# Patient Record
Sex: MALE | Race: OTHER | NOT HISPANIC OR LATINO
[De-identification: names, ages, dates, MRNs, and addresses within clinical notes are randomized per-mention and may not be internally consistent; named-entity substitution may affect disease eponyms.]

---

## 2018-05-09 PROBLEM — Z00.00 ENCOUNTER FOR PREVENTIVE HEALTH EXAMINATION: Status: ACTIVE | Noted: 2018-05-09

## 2018-05-10 ENCOUNTER — APPOINTMENT (OUTPATIENT)
Dept: OTOLARYNGOLOGY | Facility: CLINIC | Age: 28
End: 2018-05-10
Payer: COMMERCIAL

## 2018-05-10 VITALS — WEIGHT: 180 LBS | HEIGHT: 69 IN | BODY MASS INDEX: 26.66 KG/M2

## 2018-05-10 DIAGNOSIS — J34.2 DEVIATED NASAL SEPTUM: ICD-10-CM

## 2018-05-10 DIAGNOSIS — Z78.9 OTHER SPECIFIED HEALTH STATUS: ICD-10-CM

## 2018-05-10 DIAGNOSIS — J34.3 HYPERTROPHY OF NASAL TURBINATES: ICD-10-CM

## 2018-05-10 DIAGNOSIS — J30.9 ALLERGIC RHINITIS, UNSPECIFIED: ICD-10-CM

## 2018-05-10 PROCEDURE — 99204 OFFICE O/P NEW MOD 45 MIN: CPT | Mod: 25

## 2018-05-10 PROCEDURE — 31231 NASAL ENDOSCOPY DX: CPT

## 2018-07-09 ENCOUNTER — APPOINTMENT (OUTPATIENT)
Dept: OTOLARYNGOLOGY | Facility: CLINIC | Age: 28
End: 2018-07-09

## 2022-05-05 ENCOUNTER — APPOINTMENT (OUTPATIENT)
Dept: UROLOGY | Facility: CLINIC | Age: 32
End: 2022-05-05

## 2023-09-04 ENCOUNTER — EMERGENCY (EMERGENCY)
Facility: HOSPITAL | Age: 33
LOS: 0 days | Discharge: HOME | End: 2023-09-04
Attending: STUDENT IN AN ORGANIZED HEALTH CARE EDUCATION/TRAINING PROGRAM
Payer: COMMERCIAL

## 2023-09-04 VITALS
TEMPERATURE: 99 F | RESPIRATION RATE: 18 BRPM | DIASTOLIC BLOOD PRESSURE: 74 MMHG | OXYGEN SATURATION: 94 % | SYSTOLIC BLOOD PRESSURE: 120 MMHG | HEART RATE: 80 BPM

## 2023-09-04 DIAGNOSIS — R51.9 HEADACHE, UNSPECIFIED: ICD-10-CM

## 2023-09-04 DIAGNOSIS — H53.9 UNSPECIFIED VISUAL DISTURBANCE: ICD-10-CM

## 2023-09-04 LAB
ALBUMIN SERPL ELPH-MCNC: 4.6 G/DL — SIGNIFICANT CHANGE UP (ref 3.5–5.2)
ALP SERPL-CCNC: 60 U/L — SIGNIFICANT CHANGE UP (ref 30–115)
ALT FLD-CCNC: 25 U/L — SIGNIFICANT CHANGE UP (ref 0–41)
ANION GAP SERPL CALC-SCNC: 8 MMOL/L — SIGNIFICANT CHANGE UP (ref 7–14)
AST SERPL-CCNC: 25 U/L — SIGNIFICANT CHANGE UP (ref 0–41)
BASOPHILS # BLD AUTO: 0.03 K/UL — SIGNIFICANT CHANGE UP (ref 0–0.2)
BASOPHILS NFR BLD AUTO: 0.5 % — SIGNIFICANT CHANGE UP (ref 0–1)
BILIRUB SERPL-MCNC: 0.2 MG/DL — SIGNIFICANT CHANGE UP (ref 0.2–1.2)
BUN SERPL-MCNC: 25 MG/DL — HIGH (ref 10–20)
CALCIUM SERPL-MCNC: 9.4 MG/DL — SIGNIFICANT CHANGE UP (ref 8.4–10.5)
CHLORIDE SERPL-SCNC: 104 MMOL/L — SIGNIFICANT CHANGE UP (ref 98–110)
CO2 SERPL-SCNC: 27 MMOL/L — SIGNIFICANT CHANGE UP (ref 17–32)
CREAT SERPL-MCNC: 1.3 MG/DL — SIGNIFICANT CHANGE UP (ref 0.7–1.5)
EGFR: 75 ML/MIN/1.73M2 — SIGNIFICANT CHANGE UP
EOSINOPHIL # BLD AUTO: 0.28 K/UL — SIGNIFICANT CHANGE UP (ref 0–0.7)
EOSINOPHIL NFR BLD AUTO: 4.3 % — SIGNIFICANT CHANGE UP (ref 0–8)
GLUCOSE SERPL-MCNC: 91 MG/DL — SIGNIFICANT CHANGE UP (ref 70–99)
HCT VFR BLD CALC: 44.8 % — SIGNIFICANT CHANGE UP (ref 42–52)
HGB BLD-MCNC: 15.3 G/DL — SIGNIFICANT CHANGE UP (ref 14–18)
IMM GRANULOCYTES NFR BLD AUTO: 0.3 % — SIGNIFICANT CHANGE UP (ref 0.1–0.3)
LYMPHOCYTES # BLD AUTO: 1.64 K/UL — SIGNIFICANT CHANGE UP (ref 1.2–3.4)
LYMPHOCYTES # BLD AUTO: 25.3 % — SIGNIFICANT CHANGE UP (ref 20.5–51.1)
MCHC RBC-ENTMCNC: 30.6 PG — SIGNIFICANT CHANGE UP (ref 27–31)
MCHC RBC-ENTMCNC: 34.2 G/DL — SIGNIFICANT CHANGE UP (ref 32–37)
MCV RBC AUTO: 89.6 FL — SIGNIFICANT CHANGE UP (ref 80–94)
MONOCYTES # BLD AUTO: 0.44 K/UL — SIGNIFICANT CHANGE UP (ref 0.1–0.6)
MONOCYTES NFR BLD AUTO: 6.8 % — SIGNIFICANT CHANGE UP (ref 1.7–9.3)
NEUTROPHILS # BLD AUTO: 4.06 K/UL — SIGNIFICANT CHANGE UP (ref 1.4–6.5)
NEUTROPHILS NFR BLD AUTO: 62.8 % — SIGNIFICANT CHANGE UP (ref 42.2–75.2)
NRBC # BLD: 0 /100 WBCS — SIGNIFICANT CHANGE UP (ref 0–0)
PLATELET # BLD AUTO: 230 K/UL — SIGNIFICANT CHANGE UP (ref 130–400)
PMV BLD: 8.9 FL — SIGNIFICANT CHANGE UP (ref 7.4–10.4)
POTASSIUM SERPL-MCNC: 4.3 MMOL/L — SIGNIFICANT CHANGE UP (ref 3.5–5)
POTASSIUM SERPL-SCNC: 4.3 MMOL/L — SIGNIFICANT CHANGE UP (ref 3.5–5)
PROT SERPL-MCNC: 6.5 G/DL — SIGNIFICANT CHANGE UP (ref 6–8)
RBC # BLD: 5 M/UL — SIGNIFICANT CHANGE UP (ref 4.7–6.1)
RBC # FLD: 12 % — SIGNIFICANT CHANGE UP (ref 11.5–14.5)
SODIUM SERPL-SCNC: 139 MMOL/L — SIGNIFICANT CHANGE UP (ref 135–146)
TROPONIN T SERPL-MCNC: <0.01 NG/ML — SIGNIFICANT CHANGE UP
WBC # BLD: 6.47 K/UL — SIGNIFICANT CHANGE UP (ref 4.8–10.8)
WBC # FLD AUTO: 6.47 K/UL — SIGNIFICANT CHANGE UP (ref 4.8–10.8)

## 2023-09-04 PROCEDURE — 93005 ELECTROCARDIOGRAM TRACING: CPT

## 2023-09-04 PROCEDURE — 80053 COMPREHEN METABOLIC PANEL: CPT

## 2023-09-04 PROCEDURE — 70498 CT ANGIOGRAPHY NECK: CPT | Mod: 26,MA

## 2023-09-04 PROCEDURE — 99285 EMERGENCY DEPT VISIT HI MDM: CPT

## 2023-09-04 PROCEDURE — 99285 EMERGENCY DEPT VISIT HI MDM: CPT | Mod: 25

## 2023-09-04 PROCEDURE — 36415 COLL VENOUS BLD VENIPUNCTURE: CPT

## 2023-09-04 PROCEDURE — 70496 CT ANGIOGRAPHY HEAD: CPT | Mod: 26,MA

## 2023-09-04 PROCEDURE — 84484 ASSAY OF TROPONIN QUANT: CPT

## 2023-09-04 PROCEDURE — 85025 COMPLETE CBC W/AUTO DIFF WBC: CPT

## 2023-09-04 PROCEDURE — 70450 CT HEAD/BRAIN W/O DYE: CPT | Mod: MA

## 2023-09-04 PROCEDURE — 70496 CT ANGIOGRAPHY HEAD: CPT | Mod: MA

## 2023-09-04 PROCEDURE — 70498 CT ANGIOGRAPHY NECK: CPT | Mod: MA

## 2023-09-04 PROCEDURE — 70450 CT HEAD/BRAIN W/O DYE: CPT | Mod: 26,MA,59

## 2023-09-04 PROCEDURE — 93010 ELECTROCARDIOGRAM REPORT: CPT

## 2023-09-04 RX ORDER — SODIUM CHLORIDE 9 MG/ML
1000 INJECTION, SOLUTION INTRAVENOUS ONCE
Refills: 0 | Status: COMPLETED | OUTPATIENT
Start: 2023-09-04 | End: 2023-09-04

## 2023-09-04 RX ORDER — METOCLOPRAMIDE HCL 10 MG
10 TABLET ORAL ONCE
Refills: 0 | Status: COMPLETED | OUTPATIENT
Start: 2023-09-04 | End: 2023-09-04

## 2023-09-04 RX ADMIN — SODIUM CHLORIDE 1000 MILLILITER(S): 9 INJECTION, SOLUTION INTRAVENOUS at 19:36

## 2023-09-04 NOTE — ED PROVIDER NOTE - NSFOLLOWUPINSTRUCTIONS_ED_ALL_ED_FT
Visual Disturbances    You have had a disturbance in your vision. This may be caused by various conditions, such as:    Migraines. Migraine headaches are often preceded by a disturbance in vision. Blind spots or light flashes are followed by a headache. This type of visual disturbance is temporary. It does not damage the eye.  Glaucoma. This is caused by increased pressure in the eye. Symptoms include haziness, blurred vision, or seeing rainbow colored circles when looking at bright lights. Partial or complete visual loss can occur. You may or may not experience eye pain. Visual loss may be gradual or sudden and is irreversible. Glaucoma is the leading cause of blindness.  Retina problems. Vision will be reduced if the retina becomes detached or if there is a circulation problem as with diabetes, high blood pressure, or a mini-stroke. Symptoms include seeing "floaters," flashes of light, or shadows, as if a curtain has fallen over your eye.  Optic nerve problems. The main nerve in your eye can be damaged by redness, soreness, and swelling (inflammation), poor circulation, drugs, and toxins.    It is very important to have a complete exam done by a specialist to determine the exact cause of your eye problem. The specialist may recommend medicines or surgery, depending on the cause of the problem. This can help prevent further loss of vision or reduce the risk of having a stroke. Contact the caregiver to whom you have been referred and arrange for follow-up care right away.    SEEK IMMEDIATE MEDICAL CARE IF:  Your vision gets worse.  You develop severe headaches.  You have any weakness or numbness in the face, arms, or legs.  You have any trouble speaking or walking.    ADDITIONAL NOTES AND INSTRUCTIONS    Please follow up with your Primary MD in 24-48 hr.  Seek immediate medical care for any new/worsening signs or symptoms.    Headache    A headache is pain or discomfort felt around the head or neck area. The specific cause of a headache may not be found as there are many types including tension headaches, migraine headaches, and cluster headaches. Watch your condition for any changes. Things you can do to manage your pain include taking over the counter and prescription medications as instructed by your health care provider, lying down in a dark quiet room, limiting stress, getting regular sleep, and refraining from alcohol and tobacco products.    SEEK IMMEDIATE MEDICAL CARE IF YOU HAVE ANY OF THE FOLLOWING SYMPTOMS: fever, vomiting, stiff neck, loss of vision, problems with speech, muscle weakness, loss of balance, trouble walking, passing out, or confusion.

## 2023-09-04 NOTE — CONSULT NOTE ADULT - ASSESSMENT
32 yo M,  w/o significant PMH, p/w episode of distorted vison of faces and objects, "tripled vision" more on the left upper field in both eyes lasted about 45 minuts followed by L-sided HA suggestive of new-onset of ocular migraine. Possible other causes: focal epilepsy in occipital area w/o N/V is possible but followed w HA and only one episode - less likely. Absence of cardiovascular risk factors - less likely TIA as well. On today's exam he is asymptomatic, neuroexam is non-focal and admits having sleep deprivation and working hard w possible dehydration that may triggered this event. Admits having HA not often but never with visual changes before. Needs f/u neurology for MR brain w/o contrast, rEEG in OP settings.     Recommendations:     - f/u neurology in OP settings, w MRI brain w/o, rEEG  - avoid sleep deprivation, dehydration, c/w healthy life-style.   - Magnesium 400 mg daily  - Vit B- complex supplementations  - HA dairy w checking BP at the time of HA and f/u w neurology       The case was discussed w attending Dr. Greene  32 yo M,  w/o significant PMH, p/w episode of distorted vison of faces and objects, "tripled vision" more on the left upper field in both eyes lasted about 45 minuts followed by L-sided HA suggestive of new-onset of ocular migraine. Possible other causes: focal epilepsy in occipital area w/o N/V is possible but followed w HA and only one episode - less likely. Absence of cardiovascular risk factors - less likely TIA as well. On today's exam he is asymptomatic, neuroexam is non-focal and admits having sleep deprivation and working hard w possible dehydration that may triggered this event. Admits having HA not often but never with visual changes before. Needs f/u neurology for MR brain w/o contrast, rEEG in OP settings.     Recommendations:     - f/u CTH and CTA official report  - f/u neurology in OP settings, w MRI brain w/o, rEEG  - avoid sleep deprivation, dehydration, c/w healthy life-style.   - Magnesium 400 mg daily  - Vit B- complex supplementations  - HA dairy w checking BP at the time of HA and f/u w neurology       The case was discussed w attending Dr. Greene  34 yo M,  w/o significant PMH, p/w episode of distorted vison of faces and objects, "tripled vision" more on the left upper field in both eyes lasted about 45 minuts followed by L-sided HA suggestive of new-onset of ocular migraine. Possible other causes: focal epilepsy in occipital area w/o N/V is possible but followed w HA and only one episode - less likely. Absence of cardiovascular risk factors - less likely TIA as well. On today's exam he is asymptomatic, neuroexam is non-focal and admits having sleep deprivation and working hard w possible dehydration that may triggered this event. Admits having HA not often but never with visual changes before. Needs f/u neurology for MR brain w/o contrast, rEEG in OP settings.     Recommendations:     - CTH and CTA official report: negative for acute pathology, no contraindications for d/c home from neurology standpoint  - f/u neurology in OP settings, w  possible MRI brain w/o contrast, rEEG per neurologist discretion  - avoid sleep deprivation, dehydration, c/w healthy life-style.   - Magnesium 400 mg daily  - Vit B- complex supplementations  - HA dairy w checking BP at the time of HA and f/u w neurology       The case was discussed w attending Dr. Greene

## 2023-09-04 NOTE — ED PROVIDER NOTE - CLINICAL SUMMARY MEDICAL DECISION MAKING FREE TEXT BOX
Patient presented for episode of vision change followed by right sided headache. Labs and EKG were ordered and reviewed.  Imaging was ordered and reviewed by me.  Appropriate medications for patient's presenting complaints were ordered and effects were reassessed. Neurology consulted and recommending outpatient follow up. Patient feels much better and is comfortable with discharge.  Appropriate follow-up was arranged.

## 2023-09-04 NOTE — ED ADULT NURSE NOTE - NSFALLUNIVINTERV_ED_ALL_ED
Bed/Stretcher in lowest position, wheels locked, appropriate side rails in place/Call bell, personal items and telephone in reach/Instruct patient to call for assistance before getting out of bed/chair/stretcher/Non-slip footwear applied when patient is off stretcher/[place] to call system/Physically safe environment - no spills, clutter or unnecessary equipment/Purposeful proactive rounding/Room/bathroom lighting operational, light cord in reach

## 2023-09-04 NOTE — CONSULT NOTE ADULT - SUBJECTIVE AND OBJECTIVE BOX
NEUROLOGY CONSULT    HPI: 34 yo M,  w/o significant PMH, p/w episode of distorted vison of faces and objects, "tripled vision" more on the left upper field in both eyes lasted about 45 minuts followed by L-sided HA. HA: L temporal region, more sharp than blunt, no radiation, lasted hours, no photophobia, positive for phonophobia. No prior episodes, denies ambliopia/amaurosis, visual field blindness or agnosia, numbness or weakness, seizures or LOC, no trauma. Admits it was "scary" and naturally he was "panicked" a bit, many days of sleep deprivation, busy shifts at work, stresses lately, maybe some dehydration.  Contacted EM [facility], brought to ED. CTH and CTA head and neck done, preliminary no acute abnormal changes, pending official report.   No FHx of migraines, epilepsy, early strokes  No meds. Denies drugs, tobacco, alcohol.   No Known Allergies      GEN: NAD, pleasant, cooperative    NEUROLOGICAL EXAMINATION:  GENERAL:  Appearance is consistent with chronologic age.   COGNITION/LANGUAGE:  Awake, alert, and oriented to person, place, time and date.  Fluent, intact comprehension, repetition, naming. Recent and remote memory intact.  Fund of knowledge is appropriate.  Nondysarthric.    CRANIAL NERVES:   - Eyes:  Visual acuity intact. Pupils equal round and reactive, no RAPD. EOMI w/o nystagmus, skew or reported double vision. Normal visual field on confrontation. No ptosis/weakness of eyelid closure.   - Face:  Facial sensation normal V1 - 3, no facial asymmetry.    - Ears/Nose/Throat:  Hearing grossly intact b/l to finger rub.  Palate elevates midline.  Tongue and uvula midline.   MOTOR EXAM:  (R/L) 5/5 UE; 5/5 LE.  No observable drift. Normal tone and bulk. No tenderness, twitching, tremors or involuntary movements.  SENSORY EXAM:  Intact to light touch and pinprick in all extremities.  REFLEXES:   2+ b/l biceps, triceps, patella and achilles.  Plantar response downgoing b/l.  Jaw jerk, Ashley, clonus absent.  CEREBELLUM:  Finger to nose/Heel to knee and shin intact.  No dysmetria.    GAIT: narrow based and normal.  Romberg: negative.     LABS:                        15.3   6.47  )-----------( 230      ( 04 Sep 2023 19:38 )             44.8     09-04    139  |  104  |  25<H>  ----------------------------<  91  4.3   |  27  |  1.3    Ca    9.4      04 Sep 2023 19:38    TPro  6.5  /  Alb  4.6  /  TBili  0.2  /  DBili  x   /  AST  25  /  ALT  25  /  AlkPhos  60  09-04    Hemoglobin A1C:   Vitamin B12     CAPILLARY BLOOD GLUCOSE    Urinalysis Basic - ( 04 Sep 2023 19:38 )    Color: x / Appearance: x / SG: x / pH: x  Gluc: 91 mg/dL / Ketone: x  / Bili: x / Urobili: x   Blood: x / Protein: x / Nitrite: x   Leuk Esterase: x / RBC: x / WBC x   Sq Epi: x / Non Sq Epi: x / Bacteria: x    Microbiology:      RADIOLOGY, EKG AND ADDITIONAL TESTS: Reviewed.

## 2023-09-04 NOTE — ED PROVIDER NOTE - NSFOLLOWUPCLINICS_GEN_ALL_ED_FT
[facility]  Neurology  [address] [place] [address], [address]  [place] [address]  Phone: [phone]  Fax:

## 2023-09-04 NOTE — ED PROVIDER NOTE - PATIENT PORTAL LINK FT
You can access the FollowMyHealth Patient Portal offered by [facility] by registering at the following website: [email or web address]. By joining [employer]’s FollowMyHealth portal, you will also be able to view your health information using other applications (apps) compatible with our system.

## 2023-09-04 NOTE — ED PROVIDER NOTE - OBJECTIVE STATEMENT
33 year old M denies pmhx, non smoker presenting to er for eval of visual changes. Sts at 3pm while he was walking to work had b/l vision changes sts felt like he was looking into a kaleidoscope. Symptoms gradually resolved within 30 minutes. Now c/o mild rt sided ha similar to prior ha's. Pt sts had some mild chest discomfort but sts felt anxious due to his symptoms. Denies any chest pain, neck pain, stiffness, recent illness, cough, uri symptoms, lightheadedness, dizziness, syncope, extremity weakness or numbness, speech changes, unsteady gait.

## 2023-09-04 NOTE — ED PROVIDER NOTE - ATTENDING APP SHARED VISIT CONTRIBUTION OF CARE
34 yo male, no PMHx, presenting for sudden onset visual changes 3 pm when he was walking, resolved on its own within 30 minutes, no aggravating factors, no complaining of right sided headache which is similar to headaches he has had in the past. Denies fevers, chills, dizziness, nausea, vomiting, chest pain, shortness of breath, abdominal pain, trauma, AC use. He states he felt chest discomfort initially with the symptoms but never had chest pain.

## 2023-09-11 PROBLEM — Z78.9 OTHER SPECIFIED HEALTH STATUS: Chronic | Status: ACTIVE | Noted: 2023-09-04

## 2023-09-13 ENCOUNTER — NON-APPOINTMENT (OUTPATIENT)
Age: 33
End: 2023-09-13

## 2023-09-13 ENCOUNTER — APPOINTMENT (OUTPATIENT)
Dept: NEUROLOGY | Facility: CLINIC | Age: 33
End: 2023-09-13
Payer: COMMERCIAL

## 2023-09-13 VITALS
DIASTOLIC BLOOD PRESSURE: 79 MMHG | WEIGHT: 200 LBS | BODY MASS INDEX: 29.62 KG/M2 | HEART RATE: 74 BPM | SYSTOLIC BLOOD PRESSURE: 108 MMHG | HEIGHT: 69 IN

## 2023-09-13 DIAGNOSIS — G43.109 MIGRAINE WITH AURA, NOT INTRACTABLE, W/OUT STATUS MIGRAINOSUS: ICD-10-CM

## 2023-09-13 PROCEDURE — 99204 OFFICE O/P NEW MOD 45 MIN: CPT

## 2023-09-13 RX ORDER — BUDESONIDE 32 UG/1
32 SPRAY, METERED NASAL DAILY
Qty: 1 | Refills: 3 | Status: DISCONTINUED | COMMUNITY
Start: 2018-05-10 | End: 2023-09-13

## 2023-09-13 RX ORDER — MONTELUKAST 10 MG/1
10 TABLET, FILM COATED ORAL DAILY
Qty: 30 | Refills: 3 | Status: DISCONTINUED | COMMUNITY
Start: 2018-05-10 | End: 2023-09-13

## 2023-09-13 RX ORDER — MAGNESIUM OXIDE 241.3 MG/1000MG
400 TABLET ORAL DAILY
Qty: 30 | Refills: 3 | Status: ACTIVE | COMMUNITY
Start: 2023-09-13 | End: 1900-01-01

## 2023-09-13 RX ORDER — SUMATRIPTAN 100 MG/1
100 TABLET, FILM COATED ORAL
Qty: 10 | Refills: 2 | Status: ACTIVE | COMMUNITY
Start: 2023-09-13 | End: 1900-01-01

## 2023-09-13 RX ORDER — AZELASTINE HYDROCHLORIDE 205.5 UG/1
0.15 SPRAY, METERED NASAL
Qty: 2 | Refills: 6 | Status: DISCONTINUED | COMMUNITY
Start: 2018-05-10 | End: 2023-09-13

## 2023-12-04 ENCOUNTER — NON-APPOINTMENT (OUTPATIENT)
Age: 33
End: 2023-12-04

## 2024-10-24 ENCOUNTER — EMERGENCY (EMERGENCY)
Facility: HOSPITAL | Age: 34
LOS: 0 days | Discharge: HOME | End: 2024-10-24
Attending: EMERGENCY MEDICINE
Payer: COMMERCIAL

## 2024-10-24 VITALS
DIASTOLIC BLOOD PRESSURE: 80 MMHG | RESPIRATION RATE: 16 BRPM | WEIGHT: 199.96 LBS | OXYGEN SATURATION: 99 % | TEMPERATURE: 98 F | HEART RATE: 96 BPM | SYSTOLIC BLOOD PRESSURE: 121 MMHG

## 2024-10-24 PROCEDURE — 99284 EMERGENCY DEPT VISIT MOD MDM: CPT

## 2024-10-24 PROCEDURE — 73030 X-RAY EXAM OF SHOULDER: CPT | Mod: 26,LT

## 2024-10-24 PROCEDURE — 73030 X-RAY EXAM OF SHOULDER: CPT | Mod: LT

## 2024-10-24 PROCEDURE — 99283 EMERGENCY DEPT VISIT LOW MDM: CPT | Mod: 25

## 2024-10-24 RX ORDER — LIDOCAINE 50 MG/G
1 CREAM TOPICAL ONCE
Refills: 0 | Status: COMPLETED | OUTPATIENT
Start: 2024-10-24 | End: 2024-10-24

## 2024-10-24 RX ADMIN — LIDOCAINE 1 PATCH: 50 CREAM TOPICAL at 22:27

## 2024-10-24 RX ADMIN — Medication 600 MILLIGRAM(S): at 22:27

## 2024-10-24 NOTE — ED PROVIDER NOTE - CLINICAL SUMMARY MEDICAL DECISION MAKING FREE TEXT BOX
pt evaluated for shoulder pain, XR with possible AC separation. Placed in sling and advised close follow up with ortho and pt agreed. Strict return precautions advised and pt verbalized understanding.

## 2024-10-24 NOTE — ED PROVIDER NOTE - PATIENT PORTAL LINK FT
You can access the FollowMyHealth Patient Portal offered by [facility] by registering at the following website: [email or web address]. By joining [employer]’s FollowMyHealth portal, you will also be able to view your health information using other applications (apps) compatible with our system.

## 2024-10-24 NOTE — ED ADULT NURSE NOTE - NS ED NOTE  TALK SOMEONE YN
No REVIEW OF SYSTEMS:  CONSTITUTIONAL: No fever, chills, night sweats, or fatigue  EYES: No eye pain, visual disturbances, or discharge  ENMT:  No difficulty hearing, tinnitus, vertigo; No sinus or throat pain  NECK: No pain or stiffness  RESPIRATORY: No cough, wheezing, or hemoptysis; No shortness of breath  CARDIOVASCULAR: No chest pain, palpitations, dizziness, or leg swelling  GASTROINTESTINAL: No abdominal or epigastric pain. No nausea, vomiting, or hematemesis; No diarrhea or constipation. No melena or hematochezia.  GENITOURINARY: No dysuria, frequency, hematuria, or incontinence  NEUROLOGICAL: No headaches, memory loss, loss of strength, numbness, or tremors  SKIN: No itching, burning, rashes, or lesions   LYMPH NODES: No enlarged glands  ENDOCRINE: No heat or cold intolerance; No hair loss  MUSCULOSKELETAL: No joint pain or swelling; No muscle, back, or extremity pain  PSYCHIATRIC: No depression, anxiety, mood swings, or difficulty sleeping  HEME/LYMPH: No easy bruising, or bleeding gums  ALLERGY AND IMMUNOLOGIC: No hives or eczema REVIEW OF SYSTEMS:  CONSTITUTIONAL: No fever, chills, night sweats; + fatigue  EYES: No eye pain, visual disturbances, or discharge  ENMT:  No difficulty hearing, tinnitus, vertigo; No sinus or throat pain  NECK: No pain or stiffness  RESPIRATORY: No cough, wheezing, or hemoptysis; No shortness of breath  CARDIOVASCULAR: No chest pain, palpitations, dizziness, or leg swelling  GASTROINTESTINAL: + abdominal pain. No nausea, vomiting, or hematemesis; No diarrhea or constipation. No melena or hematochezia.  GENITOURINARY: No dysuria, frequency, hematuria, or incontinence  NEUROLOGICAL: No headaches, memory loss, loss of strength, numbness, or tremors  SKIN: No itching, burning, rashes, or lesions   LYMPH NODES: No enlarged glands  ENDOCRINE: No heat or cold intolerance; No hair loss  MUSCULOSKELETAL: No joint pain or swelling; No muscle, back, or extremity pain  PSYCHIATRIC: No depression, anxiety, mood swings, or difficulty sleeping  HEME/LYMPH: No easy bruising, or bleeding gums  ALLERGY AND IMMUNOLOGIC: No hives or eczema
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
## 2024-10-24 NOTE — ED PROVIDER NOTE - CARE PROVIDER_API CALL
Nazario Barker  Orthopaedic Surgery  [address] [place] [address]  Phone: [phone]  Fax: [phone]  Follow Up Time: 1-3 Days

## 2024-10-24 NOTE — ED PROVIDER NOTE - PHYSICAL EXAMINATION
CONSTITUTIONAL: Well-appearing; in no apparent distress.   HEAD: Normocephalic; atraumatic.   EYES: Pupils are round and reactive, extra-ocular muscles are intact. Eyelids are normal in appearance without swelling or lesions.   ENT: Hearing is intact with good acuity to spoken voice.  Patient is speaking clearly, not muffled and airway is intact.   RESPIRATORY: No signs of respiratory distress. Lung sounds are clear in all lobes bilaterally without rales, rhonchi, or wheezes.  CARDIOVASCULAR: Regular rate and rhythm.   GI: Abdomen is soft, non-tender, and without distention. Bowel sounds are present and normoactive in all four quadrants. No masses are noted.   BACK: No evidence of trauma or deformity. No midline tenderness. Normal ROM.   MS: L shoulder with no obvious deformity or swelling; mild tender to palpation; full ROM; sensory function intact; distal pulse present. Rest of the upper and lower extremities unremarkable. Steady gait noted.   NEURO: A & O x 3. Normal speech. No focal deficit.  PSYCHOLOGICAL: Appropriate mood and affect. Good judgement and insight.
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
## 2024-12-05 NOTE — ED PROVIDER NOTE - NS ED ATTENDING STATEMENT MOD
This was a shared visit with the LILLI. I reviewed and verified the documentation and independently performed the documented: lower abdominal pain x months, difficulty and painful urination x 1 month, LBM 4-5 days ago

## 2025-08-01 ENCOUNTER — EMERGENCY (EMERGENCY)
Facility: HOSPITAL | Age: 35
LOS: 0 days | Discharge: HOME | End: 2025-08-01
Attending: STUDENT IN AN ORGANIZED HEALTH CARE EDUCATION/TRAINING PROGRAM
Payer: COMMERCIAL

## 2025-08-01 VITALS
DIASTOLIC BLOOD PRESSURE: 71 MMHG | SYSTOLIC BLOOD PRESSURE: 118 MMHG | HEART RATE: 82 BPM | WEIGHT: 211.2 LBS | RESPIRATION RATE: 17 BRPM | TEMPERATURE: 98 F | HEIGHT: 69 IN

## 2025-08-01 DIAGNOSIS — M79.621 PAIN IN RIGHT UPPER ARM: ICD-10-CM

## 2025-08-01 DIAGNOSIS — M25.521 PAIN IN RIGHT ELBOW: ICD-10-CM
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
## 2025-08-01 PROCEDURE — 73030 X-RAY EXAM OF SHOULDER: CPT | Mod: RT

## 2025-08-01 PROCEDURE — 73060 X-RAY EXAM OF HUMERUS: CPT | Mod: RT

## 2025-08-01 PROCEDURE — 99053 MED SERV 10PM-8AM 24 HR FAC: CPT

## 2025-08-01 PROCEDURE — 73060 X-RAY EXAM OF HUMERUS: CPT | Mod: 26,RT

## 2025-08-01 PROCEDURE — 73080 X-RAY EXAM OF ELBOW: CPT | Mod: RT

## 2025-08-01 PROCEDURE — 73020 X-RAY EXAM OF SHOULDER: CPT | Mod: 26,59,RT

## 2025-08-01 PROCEDURE — 73020 X-RAY EXAM OF SHOULDER: CPT | Mod: RT

## 2025-08-01 PROCEDURE — 99284 EMERGENCY DEPT VISIT MOD MDM: CPT | Mod: 25

## 2025-08-01 PROCEDURE — 73080 X-RAY EXAM OF ELBOW: CPT | Mod: 26,RT

## 2025-08-01 PROCEDURE — 99284 EMERGENCY DEPT VISIT MOD MDM: CPT

## 2025-08-01 PROCEDURE — 73030 X-RAY EXAM OF SHOULDER: CPT | Mod: 26,RT

## 2025-08-01 RX ORDER — LIDOCAINE HYDROCHLORIDE 20 MG/ML
1 JELLY TOPICAL ONCE
Refills: 0 | Status: COMPLETED | OUTPATIENT
Start: 2025-08-01 | End: 2025-08-01

## 2025-08-01 RX ORDER — IBUPROFEN 200 MG
600 TABLET ORAL ONCE
Refills: 0 | Status: COMPLETED | OUTPATIENT
Start: 2025-08-01 | End: 2025-08-01

## 2025-08-01 RX ADMIN — LIDOCAINE HYDROCHLORIDE 1 PATCH: 20 JELLY TOPICAL at 01:05

## 2025-08-01 RX ADMIN — Medication 600 MILLIGRAM(S): at 01:05

## 2025-08-01 NOTE — ED ADULT TRIAGE NOTE - CHIEF COMPLAINT QUOTE
This big heavy bed frame fell over and it pulled my hook and yanked my arm (right) - patient   Patient on duty EM

## 2025-08-01 NOTE — ED ADULT NURSE NOTE - NS ED NURSE LEVEL OF CONSCIOUSNESS SPEECH
Speaking Coherently Brow Lift Text: A midfrontal incision was made medially to the defect to allow access to the tissues just superior to the left eyebrow. Following careful dissection inferiorly in a supraperiosteal plane to the level of the left eyebrow, several 3-0 monocryl sutures were used to resuspend the eyebrow orbicularis oculi muscular unit to the superior frontal bone periosteum. This resulted in an appropriate reapproximation of static eyebrow symmetry and correction of the left brow ptosis.

## 2025-08-01 NOTE — ED PROVIDER NOTE - PHYSICAL EXAMINATION
VITAL SIGNS: I have reviewed nursing notes and confirm.  CONSTITUTIONAL: Well-developed; well-nourished; in no acute distress.  EXT: Tenderness noted over right upper arm and right AC area.  Good full active and passive range of motion of right shoulder, right elbow, and right wrist.  2+ radial pulse.   strength intact.  Sensation intact distally.  Good capillary refill  NEURO: Alert, oriented.   PSYCH: Cooperative, appropriate.

## 2025-08-01 NOTE — ED ADULT NURSE NOTE - NSFALLUNIVINTERV_ED_ALL_ED
Bed/Stretcher in lowest position, wheels locked, appropriate side rails in place/Call bell, personal items and telephone in reach/Instruct patient to call for assistance before getting out of bed/chair/stretcher/Non-slip footwear applied when patient is off stretcher/[place] to call system/Physically safe environment - no spills, clutter or unnecessary equipment/Purposeful proactive rounding/Room/bathroom lighting operational, light cord in reach

## 2025-08-01 NOTE — ED PROVIDER NOTE - OBJECTIVE STATEMENT
Anesthesia ROS/Med Hx        Pulmonary Review:    Negative for pulmonary    Neuro/Psych Review:    Negative for all neuro/psych ROS    Cardiovascular Review:    Pt. positive for hypertension    GI/HEPATIC/RENAL Review:  GI/Hepatic/Renal Review Comments: Colon cancer     End/Other Review:    Pt. positive for obesity class II - 35.00 - 39.99      Anesthesia Plan     ASA Status: 2  Anesthesia Type: MAC  Reviewed: Medications, Lab Results, Problem List, NPO Status, Past Med History and Allergies  The proposed anesthetic plan, including its risks and benefits, have been discussed with the Patient - along with the risks and benefits of alternatives.  Questions were encouraged and answered and the patient and/or representative agrees to proceed.      Physical Exam  Mallampati: II  cardiovascular exam normal  pulmonary exam normal  Patient Demonstrates:  Obese                  
34-year-old male with no significant past medical history presents to ED with complaints of right arm pain.  Patient states that he was fixing a wall and when using one of the tools the tool got stuck and caused his right arm to be pulled.  Patient has been having pain to his right upper arm and elbow since.  Denies any falls or trauma.

## 2025-08-01 NOTE — ED PROVIDER NOTE - NSFOLLOWUPINSTRUCTIONS_ED_ALL_ED_FT
Our Emergency Department Referral Coordinators will be reaching out to you in the next 24-48 hours from 9:00am to 5:00pm with a follow up appointment with ORTH[facility]ED[facility]. Please expect a phone call from the hospital in that time frame. If you do not receive a call or if you have any questions or concerns, you can reach them at   [phone]    Arm Pain    WHAT YOU NEED TO KNOW:    Your arm pain may be caused by a number of conditions. Examples include arthritis, nerve problems, or an awkward position while you sleep. X-rays did not show a broken bone in your arm or wrist. Arm pain may be a sign of a serious condition that needs immediate care, such as a heart attack.    DISCHARGE INSTRUCTIONS:    Call your local emergency number (911 in the [place]) for any of the following:    You have any of the following signs of a heart attack:  Squeezing, pressure, or pain in your chest    You may also have any of the following:  Discomfort or pain in your back, neck, jaw, stomach, or arm    Shortness of breath    Nausea or vomiting    Lightheadedness or a sudden cold sweat    Return to the emergency department if:    You have severe pain, or pain that spreads from your arm to other areas.    You have swelling, tingling, or numbness in your hand or fingers, or the skin turns blue.    You cannot move your arm.  Call your doctor if:    You have questions or concerns about your condition or care.    Medicines: You may need any of the following:    Prescription pain medicine may be given. Ask your healthcare provider how to take this medicine safely. Some prescription pain medicines contain acetaminophen. Do not take other medicines that contain acetaminophen without talking to your healthcare provider. Too much acetaminophen may cause liver damage. Prescription pain medicine may cause constipation. Ask your healthcare provider how to prevent or treat constipation.    NSAIDs, such as ibuprofen, help decrease swelling, pain, and fever. This medicine is available with or without a doctor's order. NSAIDs can cause stomach bleeding or kidney problems in certain people. If you take blood thinner medicine, always ask your healthcare provider if NSAIDs are safe for you. Always read the medicine label and follow directions.    Take your medicine as directed. Contact your healthcare provider if you think your medicine is not helping or if you have side effects. Tell your provider if you are allergic to any medicine. Keep a list of the medicines, vitamins, and herbs you take. Include the amounts, and when and why you take them. Bring the list or the pill bottles to follow-up visits. Carry your medicine list with you in case of an emergency.  Self-care:    Rest your arm as directed. A sling may be used to keep your arm from moving while it heals.    Apply ice as directed. Ice helps decrease pain and swelling. Ice may also help prevent tissue damage. Use an ice pack, or put crushed ice in a plastic bag. Cover it with a towel. Apply it to your arm for 20 minutes every few hours, or as directed. Ask how many times to apply ice each day, and for how many days.    Elevate your arm above the level of your heart as often as you can. This will help decrease swelling and pain. Prop your arm on pillows or blankets to keep the area elevated comfortably.        Adjust your position if you work in front of a computer. You may need arm or wrist supports or change the height of your chair.    Keep a pain record. Write down when your pain happens and how severe it is. Include any other symptoms you have with your pain. A record will help you keep track of pain cycles. Bring the record with you to your follow-up visits. It may also help your healthcare provider find out what is causing your pain.  Follow up with your doctor as directed: You may need physical therapy. You may need to see an orthopedic specialist. Write down your questions so you remember to ask them during your visits.

## 2025-08-01 NOTE — ED PROVIDER NOTE - CLINICAL SUMMARY MEDICAL DECISION MAKING FREE TEXT BOX
34-year-old male with no relevant past medical history presenting today for evaluation of right elbow pain.  Patient is a , fell 2 days right arm Pulled during a fire at work.  Has been having full range of motion to the right elbow as well as right shoulder.  Denies numbness or tingling.  Denies direct trauma.  On exam no bony tenderness however noted to have mild tenderness to the lateral aspect of the right elbow.  Otherwise neurovascular intact.  No deformities, palpable and intact bicep tendon, patient is able to fully flex and extend his elbow.  Xray images independently interpreted by me, Dr. Ortiz, no acute fractures.  Likely sprain of the muscle.  Patient is aware that he may need an MRI if symptoms persist to evaluate soft tissues.  Follow-up with orthopedic surgery.

## 2025-08-01 NOTE — ED PROVIDER NOTE - PATIENT PORTAL LINK FT
You can access the FollowMyHealth Patient Portal offered by [facility] by registering at the following website: [email or web address]. By joining [employer]’s FollowMyHealth portal, you will also be able to view your health information using other applications (apps) compatible with our system.

## 2025-08-01 NOTE — ED ADULT NURSE NOTE - NS ED NURSE LEVEL OF CONSCIOUSNESS ORIENTATION
Addendum  created 12/31/17 1533 by Prudencio Caceres MD    Sign clinical note      
Oriented - self; Oriented - place; Oriented - time